# Patient Record
Sex: FEMALE | Race: OTHER | HISPANIC OR LATINO
[De-identification: names, ages, dates, MRNs, and addresses within clinical notes are randomized per-mention and may not be internally consistent; named-entity substitution may affect disease eponyms.]

---

## 2018-01-29 ENCOUNTER — RESULT REVIEW (OUTPATIENT)
Age: 33
End: 2018-01-29

## 2018-12-27 ENCOUNTER — RECORD ABSTRACTING (OUTPATIENT)
Age: 33
End: 2018-12-27

## 2018-12-27 DIAGNOSIS — Z82.49 FAMILY HISTORY OF ISCHEMIC HEART DISEASE AND OTHER DISEASES OF THE CIRCULATORY SYSTEM: ICD-10-CM

## 2018-12-27 DIAGNOSIS — Z80.49 FAMILY HISTORY OF MALIGNANT NEOPLASM OF OTHER GENITAL ORGANS: ICD-10-CM

## 2018-12-27 DIAGNOSIS — Z92.89 PERSONAL HISTORY OF OTHER MEDICAL TREATMENT: ICD-10-CM

## 2018-12-27 LAB — CYTOLOGY CVX/VAG DOC THIN PREP: NORMAL

## 2019-01-23 ENCOUNTER — APPOINTMENT (OUTPATIENT)
Dept: OBGYN | Facility: CLINIC | Age: 34
End: 2019-01-23
Payer: COMMERCIAL

## 2019-01-23 VITALS
DIASTOLIC BLOOD PRESSURE: 74 MMHG | SYSTOLIC BLOOD PRESSURE: 100 MMHG | HEIGHT: 62 IN | BODY MASS INDEX: 21.16 KG/M2 | WEIGHT: 115 LBS

## 2019-01-23 DIAGNOSIS — R10.2 PELVIC AND PERINEAL PAIN: ICD-10-CM

## 2019-01-23 DIAGNOSIS — N92.0 EXCESSIVE AND FREQUENT MENSTRUATION WITH REGULAR CYCLE: ICD-10-CM

## 2019-01-23 LAB
BILIRUB UR QL STRIP: NORMAL
GLUCOSE UR-MCNC: NORMAL
HCG UR QL: NEGATIVE
HGB UR QL STRIP.AUTO: NORMAL
KETONES UR-MCNC: NORMAL
LEUKOCYTE ESTERASE UR QL STRIP: NORMAL
NITRITE UR QL STRIP: NORMAL
PROT UR STRIP-MCNC: NORMAL
QUALITY CONTROL: YES

## 2019-01-23 PROCEDURE — 99214 OFFICE O/P EST MOD 30 MIN: CPT | Mod: 25

## 2019-01-23 PROCEDURE — 81003 URINALYSIS AUTO W/O SCOPE: CPT | Mod: QW

## 2019-01-23 PROCEDURE — 81025 URINE PREGNANCY TEST: CPT

## 2019-01-23 PROCEDURE — 58558Z: CUSTOM

## 2019-01-23 NOTE — PROCEDURE
[Endometrial Biopsy] : Endometrial biopsy [LMP ___] : LMP was [unfilled] [None] : none [de-identified] : POSSIBLE POLYP FOUND ON TVS

## 2019-01-23 NOTE — ASSESSMENT
[FreeTextEntry1] : We reviewed normal cavity on hysteroscopy.\par \par She declines treatment as she desires pregnancy soon.\par \par Will call for results in 2 weeks.\par \par Prescription for terconazole sent to the pharmacy.

## 2019-01-29 LAB — CORE LAB BIOPSY: NORMAL

## 2019-02-02 ENCOUNTER — APPOINTMENT (OUTPATIENT)
Dept: OBGYN | Facility: CLINIC | Age: 34
End: 2019-02-02

## 2019-02-08 ENCOUNTER — RX CHANGE (OUTPATIENT)
Age: 34
End: 2019-02-08

## 2019-03-16 ENCOUNTER — APPOINTMENT (OUTPATIENT)
Dept: OBGYN | Facility: CLINIC | Age: 34
End: 2019-03-16
Payer: COMMERCIAL

## 2019-03-16 ENCOUNTER — ASOB RESULT (OUTPATIENT)
Age: 34
End: 2019-03-16

## 2019-03-16 PROCEDURE — 76830 TRANSVAGINAL US NON-OB: CPT

## 2019-04-06 ENCOUNTER — APPOINTMENT (OUTPATIENT)
Dept: OBGYN | Facility: CLINIC | Age: 34
End: 2019-04-06

## 2019-05-04 ENCOUNTER — RESULT REVIEW (OUTPATIENT)
Age: 34
End: 2019-05-04

## 2019-06-05 ENCOUNTER — APPOINTMENT (OUTPATIENT)
Dept: OBGYN | Facility: CLINIC | Age: 34
End: 2019-06-05
Payer: COMMERCIAL

## 2019-06-05 ENCOUNTER — ASOB RESULT (OUTPATIENT)
Age: 34
End: 2019-06-05

## 2019-06-05 VITALS
BODY MASS INDEX: 23 KG/M2 | WEIGHT: 125 LBS | DIASTOLIC BLOOD PRESSURE: 70 MMHG | HEIGHT: 62 IN | SYSTOLIC BLOOD PRESSURE: 110 MMHG

## 2019-06-05 DIAGNOSIS — N84.0 POLYP OF CORPUS UTERI: ICD-10-CM

## 2019-06-05 DIAGNOSIS — Z30.41 ENCOUNTER FOR SURVEILLANCE OF CONTRACEPTIVE PILLS: ICD-10-CM

## 2019-06-05 DIAGNOSIS — B37.3 CANDIDIASIS OF VULVA AND VAGINA: ICD-10-CM

## 2019-06-05 LAB
BILIRUB UR QL STRIP: NORMAL
COLLECTION METHOD: NORMAL
GLUCOSE UR-MCNC: NORMAL
HCG UR QL: 0.2 EU/DL
HCG UR QL: NEGATIVE
HGB UR QL STRIP.AUTO: ABNORMAL
KETONES UR-MCNC: NORMAL
LEUKOCYTE ESTERASE UR QL STRIP: NORMAL
NITRITE UR QL STRIP: NORMAL
PH UR STRIP: 5.5
PROT UR STRIP-MCNC: NORMAL
QUALITY CONTROL: YES
SP GR UR STRIP: 1.02

## 2019-06-05 PROCEDURE — 99213 OFFICE O/P EST LOW 20 MIN: CPT | Mod: 25

## 2019-06-05 PROCEDURE — 81003 URINALYSIS AUTO W/O SCOPE: CPT | Mod: QW

## 2019-06-05 PROCEDURE — 81025 URINE PREGNANCY TEST: CPT

## 2019-06-05 PROCEDURE — 76830 TRANSVAGINAL US NON-OB: CPT

## 2019-06-05 PROCEDURE — 99395 PREV VISIT EST AGE 18-39: CPT

## 2019-06-07 NOTE — PHYSICAL EXAM
[Alert] : alert [Awake] : awake [Soft] : soft [Oriented x3] : oriented to person, place, and time [Normal] : cervix [No Bleeding] : there was no active vaginal bleeding [Uterine Adnexae] : were not tender and not enlarged [Acute Distress] : no acute distress [Mass] : no breast mass [Nipple Discharge] : no nipple discharge [Axillary LAD] : no axillary lymphadenopathy [Tender] : non tender [Distended] : not distended [Depressed Mood] : not depressed [Flat Affect] : flat affect [Labia Majora] : labia major [Labia Minora] : labia minora [Pap Obtained] : a Pap smear was performed [Tenderness] : nontender

## 2019-06-07 NOTE — HISTORY OF PRESENT ILLNESS
[Good] : being in good health [Healthy Diet] : a healthy diet [Last Pap ___] : Last cervical pap smear was [unfilled] [Reproductive Age] : is of reproductive age [Pregnancy History] : pregnancy history: [Full Term ___] : [unfilled] [Total Preg ___] : [unfilled] [Living ___] : [unfilled] [Monogamous (Male Partner)] : is monogamous with a male partner [Menarche Age: ____] : age at menarche was [unfilled] [Definite:  ___ (Date)] : the last menstrual period was [unfilled] [Sexually Active] : is sexually active [Monogamous] : is monogamous [Condoms] : uses condoms [Contraception] : uses contraception [Male ___] : [unfilled] male [Burning] : no burning [Itching] : no itching [Mass] : no mass [Stinging] : no stinging [Lesion] : no lesion [Soreness] : no soreness [Discharge] : no discharge

## 2019-06-07 NOTE — DISCUSSION/SUMMARY
[FreeTextEntry1] : Contraceptive options discussed along with the respective effectiveness, risks, benefits, and side effects. The options discussed included expectant management, condom use, OCP's, nuvaring, nexplanon, IUD.. Questions were answered. She desires OCP's. \par \par Prescription for birth control pills were electronically sent to the pharmacy. She has no contraindications to birth control pill use. Instructions on pill use, precautions, and side effects were discussed in detail. She is aware that the birth control pill is not perfect for preventing pregnancy even it she is compliant with taking the pill and therefore she needs condoms for back up. She was also made aware that the birth control pill does not protect her against sexual transmitted diseases and she still requires condom use. Questions answered.\par \par GC and chlam cxs done in case she decides on an IUD.\par \par F/U pap.\par \par Pelvic sono to F/U cyst in 3 months.\par \par

## 2019-06-07 NOTE — REVIEW OF SYSTEMS
[Nl] : Integumentary [Fever] : no fever [Chills] : no chills [Dyspnea] : no shortness of breath [Abdominal Pain] : no abdominal pain [Vomiting] : no vomiting [Pelvic Pain] : no pelvic pain [Abn Vag Bleeding] : no abnormal vaginal bleeding [Frequency] : no frequency

## 2019-06-10 LAB
C TRACH RRNA SPEC QL NAA+PROBE: NOT DETECTED
HPV HIGH+LOW RISK DNA PNL CVX: NOT DETECTED
N GONORRHOEA RRNA SPEC QL NAA+PROBE: NOT DETECTED
SOURCE TP AMPLIFICATION: NORMAL

## 2019-06-11 LAB — CYTOLOGY CVX/VAG DOC THIN PREP: NORMAL

## 2020-10-28 ENCOUNTER — APPOINTMENT (OUTPATIENT)
Dept: OBGYN | Facility: CLINIC | Age: 35
End: 2020-10-28
Payer: MEDICAID

## 2020-10-28 VITALS
BODY MASS INDEX: 26.68 KG/M2 | SYSTOLIC BLOOD PRESSURE: 118 MMHG | WEIGHT: 145 LBS | HEIGHT: 62 IN | DIASTOLIC BLOOD PRESSURE: 64 MMHG

## 2020-10-28 DIAGNOSIS — Z01.411 ENCOUNTER FOR GYNECOLOGICAL EXAMINATION (GENERAL) (ROUTINE) WITH ABNORMAL FINDINGS: ICD-10-CM

## 2020-10-28 DIAGNOSIS — N64.4 MASTODYNIA: ICD-10-CM

## 2020-10-28 DIAGNOSIS — Z30.015 ENCOUNTER FOR INITIAL PRESCRIPTION OF VAGINAL RING HORMONAL CONTRACEPTIVE: ICD-10-CM

## 2020-10-28 DIAGNOSIS — Z87.42 PERSONAL HISTORY OF OTHER DISEASES OF THE FEMALE GENITAL TRACT: ICD-10-CM

## 2020-10-28 DIAGNOSIS — R63.5 ABNORMAL WEIGHT GAIN: ICD-10-CM

## 2020-10-28 DIAGNOSIS — R23.2 FLUSHING: ICD-10-CM

## 2020-10-28 PROCEDURE — 36415 COLL VENOUS BLD VENIPUNCTURE: CPT

## 2020-10-28 PROCEDURE — 99072 ADDL SUPL MATRL&STAF TM PHE: CPT

## 2020-10-28 PROCEDURE — 99395 PREV VISIT EST AGE 18-39: CPT

## 2020-10-28 PROCEDURE — 99214 OFFICE O/P EST MOD 30 MIN: CPT | Mod: 25

## 2020-10-28 NOTE — END OF VISIT
[FreeTextEntry3] : I, Darrius Jonathan, acted solely as a scribe for Dr. Leon on this date 10/28/2020.\par All medical record entries made by the Scribe were at my, Dr. Leon's direction and personally dictated by me on  10/28/2020. I have reviewed the chart and agree that the record accurately reflects my personal performance of the history, physical exam, assessment and plan. I have also personally directed, reviewed, and agreed with the chart.\par

## 2020-10-28 NOTE — REASON FOR VISIT
[Follow-Up] : a follow-up evaluation of [FreeTextEntry2] : ANNUAL C/U,C/O BOTH BREAST PAIN ,PELVIC PAIN.

## 2020-10-28 NOTE — REVIEW OF SYSTEMS
[Patient Intake Form Reviewed] : Patient intake form was reviewed [Abdominal Pain] : abdominal pain [Nausea] : nausea [Abn Vaginal bleeding] : abnormal vaginal bleeding [Pelvic pain] : pelvic pain [Breast Pain] : breast pain [Hot Flashes] : hot flashes [Fever] : no fever [Chills] : no chills [Dyspnea] : no dyspnea [Chest Pain] : no chest pain [Vomiting] : no vomiting [Melena] : no melena

## 2020-10-28 NOTE — PLAN
[FreeTextEntry1] : GC & chlamydia culture collected. Urine culture collected to r/o UTI.\par \par Hormone panel drawn today and Breast US ordered secondary to c/o breast pain.\par \par Contraceptive options discussed including NuvaRing and IUD placement. She would like to proceed with NuvaRIng. Rx for NuvaRing was sent to the pharmacy. Instructions were given.  \par \par Pelvic sonogram ordered due to history of ovarian cyst.\par \par F/U pap\par \par All questions and concerns were addressed.\par

## 2020-10-28 NOTE — HISTORY OF PRESENT ILLNESS
[Currently Active] : currently active [Men] : men [Vaginal] : vaginal [No] : No [Patient refuses STI testing] : Patient refuses STI testing [FreeTextEntry1] : 33 yo LMP 10/19/20 is here for her annual exam. She feels well. She states that she has been having pelvic and breast pain for the past 2 months. She feels nauseous sometime. She denies having any fever, chills or vomiting. She denies having any pelvic pain during sexual intercourse. \par \par She has monthly menses. She denies STD screening today.\par \par  [Mammogramdate] : NEVER [BreastSonogramDate] : 2014 [PapSmeardate] : 6/5/19 negative [BoneDensityDate] : NEVER [ColonoscopyDate] : NEVER [TextBox_29] : n/a [TextBox_38] : n/a [TextBox_28] : n/a [TextBox_34] : n/a [TextBox_18] : n/a

## 2020-10-29 LAB
BASOPHILS # BLD AUTO: 0.1 K/UL
BASOPHILS NFR BLD AUTO: 1 %
C TRACH RRNA SPEC QL NAA+PROBE: NOT DETECTED
EOSINOPHIL # BLD AUTO: 0.19 K/UL
EOSINOPHIL NFR BLD AUTO: 1.9 %
FSH SERPL-MCNC: 5.2 IU/L
HCT VFR BLD CALC: 43.2 %
HGB BLD-MCNC: 13.4 G/DL
HPV HIGH+LOW RISK DNA PNL CVX: NOT DETECTED
IMM GRANULOCYTES NFR BLD AUTO: 0.5 %
LH SERPL-ACNC: 9.6 IU/L
LYMPHOCYTES # BLD AUTO: 2.15 K/UL
LYMPHOCYTES NFR BLD AUTO: 21.4 %
MAN DIFF?: NORMAL
MCHC RBC-ENTMCNC: 28.2 PG
MCHC RBC-ENTMCNC: 31 GM/DL
MCV RBC AUTO: 90.8 FL
MONOCYTES # BLD AUTO: 0.53 K/UL
MONOCYTES NFR BLD AUTO: 5.3 %
N GONORRHOEA RRNA SPEC QL NAA+PROBE: NOT DETECTED
NEUTROPHILS # BLD AUTO: 7.02 K/UL
NEUTROPHILS NFR BLD AUTO: 69.9 %
PLATELET # BLD AUTO: 344 K/UL
PROLACTIN SERPL-MCNC: 7.6 NG/ML
RBC # BLD: 4.76 M/UL
RBC # FLD: 14 %
SOURCE TP AMPLIFICATION: NORMAL
TSH SERPL-ACNC: 2.27 UIU/ML
WBC # FLD AUTO: 10.04 K/UL

## 2020-10-30 LAB — BACTERIA UR CULT: NORMAL

## 2020-11-02 LAB — CYTOLOGY CVX/VAG DOC THIN PREP: NORMAL

## 2020-11-04 ENCOUNTER — APPOINTMENT (OUTPATIENT)
Dept: OBGYN | Facility: CLINIC | Age: 35
End: 2020-11-04
Payer: MEDICAID

## 2020-11-04 ENCOUNTER — ASOB RESULT (OUTPATIENT)
Age: 35
End: 2020-11-04

## 2020-11-04 PROCEDURE — 76830 TRANSVAGINAL US NON-OB: CPT

## 2020-11-04 PROCEDURE — 99072 ADDL SUPL MATRL&STAF TM PHE: CPT

## 2020-11-09 ENCOUNTER — NON-APPOINTMENT (OUTPATIENT)
Age: 35
End: 2020-11-09

## 2020-11-12 ENCOUNTER — NON-APPOINTMENT (OUTPATIENT)
Age: 35
End: 2020-11-12

## 2020-11-14 ENCOUNTER — NON-APPOINTMENT (OUTPATIENT)
Age: 35
End: 2020-11-14

## 2021-04-22 ENCOUNTER — APPOINTMENT (OUTPATIENT)
Dept: OBGYN | Facility: CLINIC | Age: 36
End: 2021-04-22
Payer: MEDICAID

## 2021-04-22 ENCOUNTER — ASOB RESULT (OUTPATIENT)
Age: 36
End: 2021-04-22

## 2021-04-22 VITALS
WEIGHT: 144 LBS | HEIGHT: 62 IN | TEMPERATURE: 97 F | DIASTOLIC BLOOD PRESSURE: 62 MMHG | BODY MASS INDEX: 26.5 KG/M2 | SYSTOLIC BLOOD PRESSURE: 110 MMHG

## 2021-04-22 PROCEDURE — 99072 ADDL SUPL MATRL&STAF TM PHE: CPT

## 2021-04-22 PROCEDURE — 81025 URINE PREGNANCY TEST: CPT

## 2021-04-22 PROCEDURE — 99214 OFFICE O/P EST MOD 30 MIN: CPT | Mod: 25

## 2021-04-22 PROCEDURE — 76830 TRANSVAGINAL US NON-OB: CPT

## 2021-04-25 LAB
HCG UR QL: NEGATIVE
QUALITY CONTROL: YES

## 2021-04-25 RX ORDER — NORETHINDRONE ACETATE AND ETHINYL ESTRADIOL AND FERROUS FUMARATE 1MG-20(21)
1-20 KIT ORAL DAILY
Qty: 3 | Refills: 3 | Status: DISCONTINUED | COMMUNITY
Start: 2019-06-05 | End: 2021-04-25

## 2021-04-25 RX ORDER — ETONOGESTREL AND ETHINYL ESTRADIOL 11.7; 2.7 MG/1; MG/1
0.12-0.015 INSERT, EXTENDED RELEASE VAGINAL
Qty: 3 | Refills: 3 | Status: DISCONTINUED | COMMUNITY
Start: 2020-10-28 | End: 2021-04-25

## 2021-04-25 NOTE — HISTORY OF PRESENT ILLNESS
[Condoms] : uses condoms [Menarche Age: ____] : age at menarche was [unfilled] [Currently Active] : currently active [Men] : men [Y] : Patient reports abnormal menses [Excessive Bleeding] : bleeding has been excessive [N] : Patient does not use contraception [Ultrasound] : ultrasound [No] : No [TextBox_4] : Pt presents for a f/u of pelvic sono for pelvic pain and heavy periods [PapSmeardate] : 10/28/2020 [TextBox_31] : neg, [TextBox_63] : NEG [GonorrheaDate] : 10/28/2020 [ChlamydiaDate] : 10/28/2020 [TextBox_68] : NEG [HPVDate] : 10/28/2020 [LMPDate] : 04/01/2021 [TextBox_78] : NEG [PGxTotal] : 1 [[facility]] : 1 [[facility]] : [facility] [TextBox_27] : 04/22/2021 [FreeTextEntry1] : 04/01/2021

## 2021-04-25 NOTE — END OF VISIT
[FreeTextEntry3] : I, Carina Weiner, solely acted as scribe for Dr. Mirta Serrano on 04/22/2021.\par All medical entries made by the Scribe were at my, Dr. Serrano's direction and personally dictated by me on 04/22/2021. I have reviewed the chart and agree that the record accurately reflects my personal performance of the history, physical exam, assessment and plan. I have also personally directed, reviewed, and agreed with the chart.

## 2021-04-25 NOTE — DISCUSSION/SUMMARY
[FreeTextEntry1] : \par 4/22/21 US: uterus 10 cm anteverted. EMS 12 mm. Trace FF.\par RO 2.9 cm w/ 2.6cm complex ovarian cyst. LO 3.5 cm \par \par 34 y/o\par BMI 26\par \par #AUB\par #complex ovarian cyst\par -Reviewed today's pelvic sono, significant for small right ovarian hemorrhagic cyst and thickened endometrial lining, also seen on November 2020 sono which was concerning for endometrial polyp. \par -primary gyn planned hyst w/ emb if EMS remained thickened per prior note\par \par -Management reviewed options reviewed: \par I suspect her heavy menses are due to not being on contraception for 1 yr \par -offered Expectant management versus hysteroscopy with EMB to r/o polyps. Risks, benefits and alternatives were discussed. Patient is agreeable to hysteroscopy w/ EMB\par -patient understands she must use protection during intercourse for 2 weeks prior to the procedure to ensure the ucg on day of the procedure is accurate. She understands the procedure cannot be done if there is a chance she is pregnant\par -script sent for misoprostol and zofran the night prior and motrin 1 hr prior\par \par #infertility\par -UCG neg today\par -[facility] card given\par -Referral to [facility] given\par \par RTO 1 month for hysteroscopy with EMB\par rto 3 month for f/u pelvic us to f/u ovarian cyst

## 2021-05-14 ENCOUNTER — APPOINTMENT (OUTPATIENT)
Dept: OBGYN | Facility: CLINIC | Age: 36
End: 2021-05-14
Payer: MEDICAID

## 2021-05-14 VITALS
DIASTOLIC BLOOD PRESSURE: 78 MMHG | TEMPERATURE: 97.8 F | HEIGHT: 62 IN | BODY MASS INDEX: 26.5 KG/M2 | WEIGHT: 144 LBS | SYSTOLIC BLOOD PRESSURE: 116 MMHG

## 2021-05-14 DIAGNOSIS — N97.9 FEMALE INFERTILITY, UNSPECIFIED: ICD-10-CM

## 2021-05-14 PROCEDURE — 99072 ADDL SUPL MATRL&STAF TM PHE: CPT

## 2021-05-14 PROCEDURE — 81025 URINE PREGNANCY TEST: CPT

## 2021-05-14 PROCEDURE — 58558Z: CUSTOM

## 2021-05-14 RX ORDER — TERCONAZOLE 8 MG/G
0.8 CREAM VAGINAL
Qty: 1 | Refills: 0 | Status: DISCONTINUED | COMMUNITY
Start: 2019-01-23 | End: 2021-05-14

## 2021-05-15 PROBLEM — N97.9 FEMALE INFERTILITY: Status: ACTIVE | Noted: 2021-04-25

## 2021-05-15 NOTE — END OF VISIT
[FreeTextEntry3] : I, Carina Weiner, solely acted as scribe for Dr. Mirta Serrano on 05/14/2021.\par All medical entries made by the Scribe were at my, Dr. Serrano's direction and personally dictated by me on 05/14/2021. I have reviewed the chart and agree that the record accurately reflects my personal performance of the history, physical exam, assessment and plan. I have also personally directed, reviewed, and agreed with the chart.

## 2021-05-15 NOTE — PLAN
[FreeTextEntry1] : 34 y/o presents for hysteroscopy with EMB\par \par 4/22/21 US: uterus 10 cm anteverted. EMS 12 mm. Trace FF.\par RO 2.9 cm w/ 2.6cm complex ovarian cyst. LO 3.5 cm \par \par #AUB: heavier menses over the last couple months; last menses was a bit lighter\par #EMB w/ hysteroscopy today\par -Procedure details, R/B/A were discussed. Consent was obtained\par -ucg neg\par -Patient tolerated well, fluffy endometrium noted w/o masses\par -EMB Specimen sent to pathology\par -post-procedure instructions provided\par -Will call pt with results\par \par #h/o ovarian cyst 2.6 cm RO on 4/22/21\par -pelvic US in 3 months to monitor\par \par #infertility\par -pt plans to schedule consult with [facility]\par \par RTO in 3 months\par [ ] pelvic US f/u ovarian cyst

## 2021-05-15 NOTE — PROCEDURE
[Hysteroscopy] : Hysteroscopy [Endometrial Biopsy] : Endometrial biopsy [Time out performed] : Pre-procedure time out performed.  Patient's name, date of birth and procedure confirmed. [Consent Obtained] : Consent obtained [Uterine Perforation] : uterine perforation [Pain] : pain [Negative] : negative pregnancy test [Tenaculum] : Tenaculum [Easy Passage] : Easy passage [No Complications] : No complications [Risks] : risks [Benefits] : benefits [Alternatives] : alternatives [Patient] : patient [Infection] : infection [Bleeding] : bleeding [Allergic Reaction] : allergic reaction [Pretreatment with misoprostol] : pretreatment with misoprostol [flexible] : Using aseptic technique a hysteroscopy was performed using a flexible hysteroscope [Sent to Pathology] : specimen was placed in buffered formalin and sent for pathology [Hemostasis obtained] : hemostasis obtained [Tolerated Well] : Patient tolerated the procedure well [Betadine] : Betadine [Sounded to ___ cm] : sounded to [unfilled] ~Ucm [Abundant] : abundant [Ibuprofen ___ mg] : ibuprofen [unfilled] ~Umg [Cytotec] : Cytotec [Antibiotics given] : antibiotics not given [de-identified] : o [LMPDate] : 4/30/21 [de-identified] : Pipelle inserted [de-identified] : EBL <5 cc; hemostatic [de-identified] : heavy menses with suspected endometrial polyp [de-identified] : Bimanual 10 cm uterus. normal adnexa. EMB done w/ pipelle prior. Fluffy endometrial lining thickened, no masses seen. Ostia seen bilaterally.  [de-identified] : EBL <5cc

## 2021-05-24 ENCOUNTER — APPOINTMENT (OUTPATIENT)
Dept: OBGYN | Facility: CLINIC | Age: 36
End: 2021-05-24
Payer: MEDICAID

## 2021-05-24 DIAGNOSIS — N93.9 ABNORMAL UTERINE AND VAGINAL BLEEDING, UNSPECIFIED: ICD-10-CM

## 2021-05-24 LAB — CORE LAB BIOPSY: NORMAL

## 2021-05-24 PROCEDURE — 99442: CPT | Mod: 95

## 2021-05-24 PROCEDURE — ZZZZZ: CPT

## 2021-07-13 PROBLEM — N93.9 ABNORMAL UTERINE BLEEDING (AUB): Status: ACTIVE | Noted: 2021-04-25

## 2021-08-05 ENCOUNTER — NON-APPOINTMENT (OUTPATIENT)
Age: 36
End: 2021-08-05

## 2021-08-05 ENCOUNTER — APPOINTMENT (OUTPATIENT)
Dept: RHEUMATOLOGY | Facility: CLINIC | Age: 36
End: 2021-08-05
Payer: MEDICAID

## 2021-08-05 VITALS
HEART RATE: 78 BPM | SYSTOLIC BLOOD PRESSURE: 115 MMHG | DIASTOLIC BLOOD PRESSURE: 78 MMHG | TEMPERATURE: 97.7 F | HEIGHT: 61 IN | WEIGHT: 142 LBS | OXYGEN SATURATION: 98 % | BODY MASS INDEX: 26.81 KG/M2

## 2021-08-05 DIAGNOSIS — M54.5 LOW BACK PAIN: ICD-10-CM

## 2021-08-05 DIAGNOSIS — M25.50 PAIN IN UNSPECIFIED JOINT: ICD-10-CM

## 2021-08-05 DIAGNOSIS — R20.0 ANESTHESIA OF SKIN: ICD-10-CM

## 2021-08-05 DIAGNOSIS — R20.2 ANESTHESIA OF SKIN: ICD-10-CM

## 2021-08-05 DIAGNOSIS — M25.569 PAIN IN UNSPECIFIED KNEE: ICD-10-CM

## 2021-08-05 PROCEDURE — 99205 OFFICE O/P NEW HI 60 MIN: CPT

## 2021-08-05 PROCEDURE — 99072 ADDL SUPL MATRL&STAF TM PHE: CPT

## 2021-08-05 RX ORDER — NAPROXEN SODIUM 220 MG
TABLET ORAL
Refills: 0 | Status: ACTIVE | COMMUNITY

## 2021-08-05 RX ORDER — MEDROXYPROGESTERONE ACETATE 10 MG/1
10 TABLET ORAL
Qty: 10 | Refills: 3 | Status: DISCONTINUED | COMMUNITY
Start: 2021-05-24 | End: 2021-08-05

## 2021-08-05 RX ORDER — IBUPROFEN 800 MG/1
800 TABLET ORAL
Qty: 5 | Refills: 0 | Status: DISCONTINUED | COMMUNITY
Start: 2021-04-25 | End: 2021-08-05

## 2021-08-05 RX ORDER — MISOPROSTOL 200 UG/1
200 TABLET ORAL
Qty: 2 | Refills: 0 | Status: DISCONTINUED | COMMUNITY
Start: 2021-04-25 | End: 2021-08-05

## 2021-08-05 RX ORDER — ACETAMINOPHEN 500 MG
TABLET ORAL
Refills: 0 | Status: DISCONTINUED | COMMUNITY
End: 2021-08-05

## 2021-08-05 RX ORDER — ONDANSETRON 8 MG/1
8 TABLET, ORALLY DISINTEGRATING ORAL
Qty: 3 | Refills: 0 | Status: DISCONTINUED | COMMUNITY
Start: 2021-04-25 | End: 2021-08-05

## 2021-08-05 NOTE — PHYSICAL EXAM
[General Appearance - Alert] : alert [General Appearance - In No Acute Distress] : in no acute distress [Sclera] : the sclera and conjunctiva were normal [Extraocular Movements] : extraocular movements were intact [Outer Ear] : the ears and nose were normal in appearance [Neck Appearance] : the appearance of the neck was normal [Respiration, Rhythm And Depth] : normal respiratory rhythm and effort [Heart Rate And Rhythm] : heart rate was normal and rhythm regular [Heart Sounds] : normal S1 and S2 [Abdomen Soft] : soft [Abdomen Tenderness] : non-tender [Cervical Lymph Nodes Enlarged Anterior Bilaterally] : anterior cervical [Supraclavicular Lymph Nodes Enlarged Bilaterally] : supraclavicular [No CVA Tenderness] : no ~M costovertebral angle tenderness [Motor Tone] : muscle strength and tone were normal [] : no rash [No Focal Deficits] : no focal deficits [Impaired Insight] : insight and judgment were intact [Mood] : the mood was normal [FreeTextEntry1] : no synovitis or effusion on exam noted today; full ROM in b/l shoulders w/ soreness

## 2021-08-05 NOTE — ASSESSMENT
[FreeTextEntry1] : 35-year-old female, here for the first time reports of joint aches in the shoulders, knees and LBP to rule out inflammatory arthropathy incld RA, seronegative spondyloarthropathy.\par -No synovitis or effusion on exam noted today and advised to monitor.\par -labs as below incld ESR, CRP, serologies, lyme, TSH\par -dry mouth: discussed biotene mouthwash or toothpaste or spray PRN; will check Sjogren abs \par \par b/l shoulder soreness: past few months\par -Has full ROM in b/l shoulders, no pelvic/girdle stiffness and able to stand up without using her hands, no temporal pain/unremitting headaches, no vision changes, no jaw pain.\par -Referred for xray of b/l shoulders to evaluate for structural changes, eval for calcific tendonitis, high riding humerus/rotator cuff pathology\par -discussed she can consider steroid injection if needed\par \par Knee pain -Referred for xray of b/l knees to evaluate for structural changes, OA\par -consider steroid injections \par \par LBP: intermittent \par -Referred for xray of LS spine to evaluate for structural changes, DDD, confirm SI normal \par -Aleve PRN w/ food needed sparingly helps\par \par Intermittent numbness/tingling: intermittent in feet. \par -check metabolic labs incld folate, B12, TSH, HbA1C for it.\par -if persistent can consider EMG w/ Neuro\par \par -educated on symptoms to monitor for in detail and alert us if any concerns.\par -knows to stay up to date on health maintenance w/ PCP\par -f/u in 10-14days w/ labs, xrays please\par \par \par

## 2021-08-13 ENCOUNTER — LABORATORY RESULT (OUTPATIENT)
Age: 36
End: 2021-08-13

## 2021-08-15 LAB
25(OH)D3 SERPL-MCNC: 39.9 NG/ML
ALBUMIN SERPL ELPH-MCNC: 4.7 G/DL
ALP BLD-CCNC: 80 U/L
ALT SERPL-CCNC: 43 U/L
ANION GAP SERPL CALC-SCNC: 14 MMOL/L
AST SERPL-CCNC: 22 U/L
BASOPHILS # BLD AUTO: 0.08 K/UL
BASOPHILS NFR BLD AUTO: 0.8 %
BILIRUB SERPL-MCNC: 0.3 MG/DL
BUN SERPL-MCNC: 13 MG/DL
C TRACH RRNA SPEC QL NAA+PROBE: NOT DETECTED
CALCIUM SERPL-MCNC: 9.8 MG/DL
CHLORIDE SERPL-SCNC: 101 MMOL/L
CK SERPL-CCNC: 48 U/L
CO2 SERPL-SCNC: 22 MMOL/L
CREAT SERPL-MCNC: 0.66 MG/DL
CRP SERPL-MCNC: 16 MG/L
EOSINOPHIL # BLD AUTO: 0.15 K/UL
EOSINOPHIL NFR BLD AUTO: 1.5 %
ERYTHROCYTE [SEDIMENTATION RATE] IN BLOOD BY WESTERGREN METHOD: 50 MM/HR
ESTIMATED AVERAGE GLUCOSE: 103 MG/DL
FOLATE SERPL-MCNC: 6.6 NG/ML
GLUCOSE SERPL-MCNC: 89 MG/DL
HAV IGM SER QL: ABNORMAL
HBA1C MFR BLD HPLC: 5.2 %
HBV CORE IGM SER QL: NONREACTIVE
HBV SURFACE AG SER QL: NONREACTIVE
HCT VFR BLD CALC: 43.7 %
HCV AB SER QL: NONREACTIVE
HCV S/CO RATIO: 0.13 S/CO
HGB BLD-MCNC: 13.4 G/DL
IMM GRANULOCYTES NFR BLD AUTO: 0.5 %
LYMPHOCYTES # BLD AUTO: 2.24 K/UL
LYMPHOCYTES NFR BLD AUTO: 22.9 %
MAN DIFF?: NORMAL
MCHC RBC-ENTMCNC: 27.5 PG
MCHC RBC-ENTMCNC: 30.7 GM/DL
MCV RBC AUTO: 89.7 FL
MONOCYTES # BLD AUTO: 0.61 K/UL
MONOCYTES NFR BLD AUTO: 6.2 %
N GONORRHOEA RRNA SPEC QL NAA+PROBE: NOT DETECTED
NEUTROPHILS # BLD AUTO: 6.64 K/UL
NEUTROPHILS NFR BLD AUTO: 68.1 %
PLATELET # BLD AUTO: 320 K/UL
POTASSIUM SERPL-SCNC: 4.3 MMOL/L
PROT SERPL-MCNC: 7.5 G/DL
RBC # BLD: 4.87 M/UL
RBC # FLD: 15 %
RHEUMATOID FACT SER QL: <10 IU/ML
SODIUM SERPL-SCNC: 137 MMOL/L
SOURCE AMPLIFICATION: NORMAL
TSH SERPL-ACNC: 2.13 UIU/ML
VIT B12 SERPL-MCNC: 970 PG/ML
WBC # FLD AUTO: 9.77 K/UL

## 2021-08-19 ENCOUNTER — ASOB RESULT (OUTPATIENT)
Age: 36
End: 2021-08-19

## 2021-08-19 ENCOUNTER — APPOINTMENT (OUTPATIENT)
Dept: OBGYN | Facility: CLINIC | Age: 36
End: 2021-08-19
Payer: MEDICAID

## 2021-08-19 VITALS
HEIGHT: 61 IN | SYSTOLIC BLOOD PRESSURE: 112 MMHG | WEIGHT: 142 LBS | BODY MASS INDEX: 26.81 KG/M2 | DIASTOLIC BLOOD PRESSURE: 70 MMHG

## 2021-08-19 DIAGNOSIS — N83.209 UNSPECIFIED OVARIAN CYST, UNSPECIFIED SIDE: ICD-10-CM

## 2021-08-19 DIAGNOSIS — R10.2 PELVIC AND PERINEAL PAIN: ICD-10-CM

## 2021-08-19 DIAGNOSIS — N94.6 DYSMENORRHEA, UNSPECIFIED: ICD-10-CM

## 2021-08-19 DIAGNOSIS — Z31.69 ENCOUNTER FOR OTHER GENERAL COUNSELING AND ADVICE ON PROCREATION: ICD-10-CM

## 2021-08-19 PROCEDURE — 99213 OFFICE O/P EST LOW 20 MIN: CPT

## 2021-08-19 PROCEDURE — 76830 TRANSVAGINAL US NON-OB: CPT

## 2021-08-19 PROCEDURE — 99072 ADDL SUPL MATRL&STAF TM PHE: CPT

## 2021-08-19 NOTE — END OF VISIT
[FreeTextEntry3] : I, Karina bryant acted as scribe for Dr. Mirta Serrano on 08/19/2021. All medical entries made by the Scribe were at my, Dr. Serrano's, direction and personally dictated by me on 08/19/2021. I have reviewed the chart and agree that the record accurately reflects my personal performance of the history, physical exam, assessment, and plan. I have also personally directed, reviewed, and agreed with the chart.

## 2021-08-19 NOTE — DISCUSSION/SUMMARY
[FreeTextEntry1] : \par 21 US: Uterus 10 cm. EMS 4.4 mm. RO 4 cm with simple 2.6 cm cyst. LO 2 cm. No FF.\par \par 36 y/o \par #hx Complex ovarian cyst\par -Reviewed today's pelvic sono, significant for: resolved complex cyst; new simple cyst\par -per SRU guidelines, no further f/u needed\par \par #IPreconception\par #hx of rheumatic fever as a child\par #Joint pain after covid 2021\par -has [facility] information prn\par -optimize co-morbidities; PNV/MV\par -patient to consider Cardiology eval prior to pregnancy due to hx of rheumatic fever\par -con't Rheumatology evaluation in progress \par \par #Vaginal burning prior and post-menses\par #dysmenorrhea\par -Advised pt to take Motrin a few days prior to menses BID. \par -Discussed use of Monistat is pt feels vaginal burning returns; certainly she can return for exam if sx return\par \par RTO in 2021 for gyn annual or as needed.

## 2021-08-19 NOTE — HISTORY OF PRESENT ILLNESS
[Patient reported PAP Smear was normal] : Patient reported PAP Smear was normal [Gonorrhea test offered] : Gonorrhea test offered [Chlamydia test offered] : Chlamydia test offered [HPV test offered] : HPV test offered [N] : Patient does not use contraception [Monogamous (Male Partner)] : is monogamous with a male partner [Y] : Positive pregnancy history [Menarche Age: ____] : age at menarche was [unfilled] [Currently Active] : currently active [Men] : men [Vaginal] : vaginal [No] : No [Patient refuses STI testing] : Patient refuses STI testing [TextBox_4] : SONO FOLLOW UP  [PapSmeardate] : 10/28/20 [TextBox_31] : NEG  [GonorrheaDate] : 10/28/20 [TextBox_63] : NEG [ChlamydiaDate] : 10/28/20 [HPVDate] : 10/28/20 [TextBox_68] : NEG [TextBox_78] : NEG  [LMPDate] : 08/14/21 [[facility]erm] : 1 [PGxTotal] : 1 [[facility]] : 1 [FreeTextEntry1] : 08/14/21

## 2021-08-26 LAB
ACE BLD-CCNC: 26 U/L
ANA SER IF-ACNC: NEGATIVE
B19V IGG SER QL IA: 0.87 INDEX
B19V IGG+IGM SER-IMP: NEGATIVE
B19V IGG+IGM SER-IMP: NORMAL
B19V IGM FLD-ACNC: 0.1 INDEX
B19V IGM SER-ACNC: NEGATIVE
CCP AB SER IA-ACNC: <8 UNITS
ENA RNP AB SER IA-ACNC: <0.2 AL
ENA SM AB SER IA-ACNC: <0.2 AL
ENA SS-A AB SER IA-ACNC: 4.3 AL
ENA SS-B AB SER IA-ACNC: <0.2 AL
G6PD SER-CCNC: 16.9 U/G HGB
HLA-B27 RELATED AG QL: NEGATIVE
RF+CCP IGG SER-IMP: NEGATIVE

## 2021-08-30 LAB

## 2021-11-04 ENCOUNTER — APPOINTMENT (OUTPATIENT)
Dept: OBGYN | Facility: CLINIC | Age: 36
End: 2021-11-04

## 2022-01-12 ENCOUNTER — APPOINTMENT (OUTPATIENT)
Dept: RHEUMATOLOGY | Facility: CLINIC | Age: 37
End: 2022-01-12
Payer: MEDICAID

## 2022-01-12 DIAGNOSIS — M25.519 PAIN IN UNSPECIFIED SHOULDER: ICD-10-CM

## 2022-01-12 DIAGNOSIS — R79.82 ELEVATED C-REACTIVE PROTEIN (CRP): ICD-10-CM

## 2022-01-12 DIAGNOSIS — R70.0 ELEVATED ERYTHROCYTE SEDIMENTATION RATE: ICD-10-CM

## 2022-01-12 DIAGNOSIS — R76.8 OTHER SPECIFIED ABNORMAL IMMUNOLOGICAL FINDINGS IN SERUM: ICD-10-CM

## 2022-01-12 DIAGNOSIS — M19.049 PRIMARY OSTEOARTHRITIS, UNSPECIFIED HAND: ICD-10-CM

## 2022-01-12 DIAGNOSIS — M35.00 SICCA SYNDROME, UNSPECIFIED: ICD-10-CM

## 2022-01-12 DIAGNOSIS — R68.2 DRY MOUTH, UNSPECIFIED: ICD-10-CM

## 2022-01-12 DIAGNOSIS — M25.571 PAIN IN RIGHT ANKLE AND JOINTS OF RIGHT FOOT: ICD-10-CM

## 2022-01-12 PROCEDURE — 99214 OFFICE O/P EST MOD 30 MIN: CPT | Mod: 95

## 2022-01-12 RX ORDER — HYDROXYCHLOROQUINE SULFATE 200 MG/1
200 TABLET, FILM COATED ORAL
Qty: 42 | Refills: 1 | Status: ACTIVE | COMMUNITY
Start: 2022-01-12 | End: 1900-01-01

## 2022-01-12 NOTE — ASSESSMENT
[FreeTextEntry1] : 36-year-old female, for first follow up w/ +RO/SSA =4.3 w/ reports of dry mouth, vaginal dryness and joint aches in the Rt hand/MCPs, Rt ankle, soreness in the shoulders w/ high ESR=50; high CRP=16 concerning for  Sjogren syndrome at this time.\par -discussed Sjogren syndrome in detail today & link given about it on the ACR to review \par -reviewed labs 21 in detail w/ +RO/SSA =4.3; ESR=50; high CRP=16; hep A ab indeterminate (to repeat w/o symptoms); CBC/CMP ok; other serologies normal at this time \par -discussed r/b/s of Plaquenil 300 mg PO total, closer to wt based w/ G6PD normal w/ pt agreeable and prescription sent as below\par -optho referral provided for retinal screening on Plaquenil as discussed & check for dry eyes please that she feels once in a while perhaps \par -labs as below to monitor \par -dry mouth: discussed biotene mouthwash or toothpaste or spray PRN \par -Discussed RO/SSA abs are associated w/  lupus (congenital heart block,  transient skin rash, hemological and hepatic abnormalities). States 1 healthy pregnancies without miscarriages without plans for pregnancy and if did will see high risk ob to watch for congenital heart block as discussed.\par \par Raised ESR= 50; high CRP=16: likely in the setting of Sjogren syndrome w/ joint aches \par -denies any signs of infection; denies any wt loss or night sweats and knows to stay up to date on health maintenance \par -will monitor on repeat\par \par b/l shoulder soreness: past few months\par -Has full ROM in b/l shoulders, no pelvic/girdle stiffness and able to stand up without using her hands, no temporal pain/unremitting headaches, no vision changes, no jaw pain.\par -was referred for xray of b/l shoulders to evaluate for structural changes, eval for calcific tendonitis, high riding humerus/rotator cuff pathology \par -discussed she can consider steroid injection if needed\par \par Intermittent numbness/tingling: intermittent in feet. \par -reviewed metabolic labs all normal 8/13/21 incld folate, B12, TSH, HbA1C for it.\par -if persistent can consider EMG w/ Neuro\par \par -educated on symptoms to monitor for in detail and alert us if any concerns.\par -knows to stay up to date on health maintenance w/ PCP\par -f/u in 6-8 weeks w/ labs, xray or sooner as needed please \par

## 2022-01-12 NOTE — HISTORY OF PRESENT ILLNESS
[FreeTextEntry1] : Verbal consent given for telehealth video visit on 1/12/22 by patient, via [employer] with visit done from my [employer] laptop at [address] [place] [address] and patient at her home in [place].\par \par 36-year-old female for first follow up. Patient states she has been noticing some joint aches her the past few years and more so since January of 2021 this year.\par Today she states she notes some achy pain in the Rt hand around the MCPs and at times wears a brace to help the pain she reports. \par States she also notes achy pain in her right ankle. \par Patient states she notices soreness in her bilateral shoulders with full range of motion. Denies any injury or trauma to the shoulders.\par Denies any fever/chills, no rashes, no ulcers, perhaps dry eyes at times, + dry mouth, no raynaud's, + vaginal dryness; no infectious diarrhea or  symptoms at this time.\par

## 2022-01-12 NOTE — PHYSICAL EXAM
[General Appearance - Alert] : alert [General Appearance - In No Acute Distress] : in no acute distress [Sclera] : the sclera and conjunctiva were normal [Extraocular Movements] : extraocular movements were intact [Motor Tone] : muscle strength and tone were normal [] : no rash [Impaired Insight] : insight and judgment were intact [Mood] : the mood was normal [FreeTextEntry1] : Rt hand 2-5 MCP tenderness; Rt ankle tenderness; soreness in b/l shoulders w/ good ROM

## 2022-01-12 NOTE — REASON FOR VISIT
[Follow-Up: _____] : a [unfilled] follow-up visit [FreeTextEntry1] : joint aches; +RO; high ESR/CRP; review labs/med